# Patient Record
(demographics unavailable — no encounter records)

---

## 2024-10-22 NOTE — ASSESSMENT
[Reviewed updated] : Reviewed updated [Designated Health Care Proxy] : Designated Health Care Proxy [Name: ___] : Name: [unfilled] [Relationship: ___] : Relationship: [unfilled] [DNR] : DNR [DNI] : DNI [Last Verification Date: ___] : Last Verification Date: [unfilled] [FreeTextEntry1] : ## Recurrent metastatic Lung adenocarcinoma status post left iliac bone biopsy with IR [6/28/2024]: Path: Metastatic adenocarcinoma, with mucinous features. This biopsy is compared with previous mediastinal biopsy ([id number]) and shows similar histologic features and IHC profile. s/p SBRT to the left iliac lesion QOD x 3 sessions (Completed 7/29/24) s/p hospitalization at [facility] 8/2/2024 -8/9/2024 for sepsis 2/2 PNA, hypophosphatemia, and pancytopenia after C1.   Here for C5 Taxotere Cyramza Zometa (7/30/24 - present) Feels much better Likely had viral URTI after C4, complicated by superinfection with bacteria. Did not go to ER as he does not trust his local ER nad did not want to travel 2 hours to come to [facility]. Symptoms significantly improved with antibiotics Restaging CT shows improved lung mass but with increase in size of bone lesion, ? adrenal lesion, cirrhosis, and fluid overload Discussed about findings and that he has mixed response. Unfortunately we have limited treatment options including gemcitabine, alimta, ipi+nivo, abraxane He wants to continue with current treatment with close follow up scan as he is tolerating treatment well Labs ordered, drawn in the office, reviewed, analyzed and discussed Proceed with C5 taxotere cyramza Restaging scan in 2-3 months or sooner if needed Continue with phos supplementation. Follow with nephrology Follow up guardant 360  Plan: Taxotere 75 mg/m2 + Cyramza 10 mg/kg every 3 weeks with neulasta Administer Zometa (zoledronic acid) injection every 6 weeks to strengthen bones and reduce risk of fractures. Dex 3 pills night before and AM of the treatment Oxycodone renewed 7/30/24 Zofran PRN for nausea Imodium PRN for diarrhea  #bone mets - to get Zometa q6months - 7/30/2024 - Zometa held due to hypophosphatemia.  - to take Calcium and vitamin D  #Tachycardia  8/28/2024 Echo - EF 55% Seen by cardiologist  Original presentation ## Left lung adenocarcinoma with mucinous features Found on CT done for hoarseness of voice Reports that he noticed change of voice since end of Dec 2023 Saw Dr Pressley (ENT)-> will obtain CT scan Former smoker- 2 ppd x 40 years. Quit 2014, However vaping (Elizabeth) 1-1.5 pod a day. Quit 2 weeks ago PET/CT 2/28/2024: 7.2 x 4.4 cm left hilar mass extending into left apex, SUV 9.4.  Hypermetabolic station 5, station 7 nodes.  Mildly FDG avid station 4R and periaortic lymph node indeterminate in nature.  Soft tissue densities in the left parotid gland most likely lymph nodes SUV 6.9, indeterminate in nature. Clinical stage IIIB/C pT4N2/3M0  3/2024 CT head: No metastasis.  *Could not obtain MRI brain due to vagal nerve stimulator [facility] lung cancer NGS panel: Clear to variant of potential clinical significance STK 11. TMB low, CARINA, PD-L1 TPS 0% [id number] liquid biopsy: VUS in ROBEL, KEAP1, NF1, STK 11 mutation s/p 30 rounds of radiation (3/26/2024 -5/7/2024) s/p C4 carboplatin pemetrexed (3/26/24 -5/20/2024)  ## Elevated PTT Denies any bleeding, clotting. Not on any blood thinners Normal mixing studies, factor 8, 9, 11, 12, with positive silica clotting time, elevated cardiolipin AB IgM (33.4) and B2 glycoprotein IgM (36.3). Will repeat labs in 3 months.   Prostate cancer On active surveillance with Dr Anand  Melanoma - right cheek resected in 2020 Has multiple basal cells   Genetic testing: [id number] liquid biopsy shows ROBEL  mutation  As per [id number], this mutation is suspicious for germline origin and possibly associated with underlying inherited predisposition to certain cancer. His only family history is his father had bladder cancer. Refer to genetics for complete evaluation  Social history: Lives with his wife Tosha in [facility] Quit vaping 3 weeks ago, heavy smoker until 2014 Worked as a   Patient and wife had multiple questions which were answered to satisfaction  Follow up in 3 weeks CBC, CMP, phosphorus [AdvancecareDate] : 06/18/24

## 2024-10-22 NOTE — ASSESSMENT
[FreeTextEntry1] : # Hypophosphatemia Etiology likely from -Decrease absorption from Mylanta or or phos binder TUMs or 2/2 to decrease bone resorption from zometa. - UA: no glucosuria, less likely Fanconi like sd from platinum base.  - Elevated 1-25 vit D , therefore less likely from elevated FGF23 activity.  -PTHrP wnl -urine phosphate low 10.4, urine cr 29.4, urine vol 2.6L - FePO4:  ~ 6 , low  - Phosphate 2.2 from 3.4 but had diarrhea  - cont to Avoid TUMS and Mylanta -cont neutra phos to 500mg 4 times daily.   RTC 6 weeks

## 2024-10-22 NOTE — PHYSICAL EXAM
[General Appearance - Alert] : alert [General Appearance - In No Acute Distress] : in no acute distress [Heart Rate And Rhythm] : heart rate was normal and rhythm regular [Heart Sounds] : normal S1 and S2 [Edema] : there was no peripheral edema [Abdomen Soft] : soft [Abdomen Tenderness] : non-tender [Abnormal Walk] : normal gait [Oriented To Time, Place, And Person] : oriented to person, place, and time

## 2024-10-22 NOTE — HISTORY OF PRESENT ILLNESS
[FreeTextEntry1] : Referred by his oncologist Dr. Jaffe. He has hx of DM, CASIMIRO, melanoma and recent dx of left lung metastatic adenocarcinoma dx on February. He was initially treated with carboplatin pemetrexed (3/26/24 -5/20/2024).  He is currently on chemotherapy Taxotere Cyramza Zometa. Zometa held due to hypophosphatemia. report last infection july.  Phosphate has been low since August, he stated getting phosphate infusion every 3 weeks with his chemo session. He reports symptoms of persistent fatigue, weakness about 3 days after phosphate/chemo infusion which he believes due to low phos level. He stated feeling great post 3 days after iv phos infusion. He is also on neutra phos.  He endorsed taking TUMS and Mylanta.  Denies diarrhea, no alcohol use. Denies iv iron supplements  10/22 Patient reports had aspiration PNA and was started on antibiotic for 2 weeks. He has diarrhea since taking antibiotic, which improving with Pepto Bismol.  Lab reviewed discussed with patient and his wife. Phosphorus stable, initially improved to 3.4 then drop to 2.2 likely because of diarrhea.  He stopped taking Mylanta and TUMS.

## 2024-10-22 NOTE — ASSESSMENT
[Reviewed updated] : Reviewed updated [Designated Health Care Proxy] : Designated Health Care Proxy [Name: ___] : Name: [unfilled] [Relationship: ___] : Relationship: [unfilled] [DNR] : DNR [DNI] : DNI [Last Verification Date: ___] : Last Verification Date: [unfilled] [FreeTextEntry1] : ## Recurrent metastatic Lung adenocarcinoma status post left iliac bone biopsy with IR [6/28/2024]: Path: Metastatic adenocarcinoma, with mucinous features. This biopsy is compared with previous mediastinal biopsy ([id number]) and shows similar histologic features and IHC profile. s/p SBRT to the left iliac lesion QOD x 3 sessions (Completed 7/29/24) s/p hospitalization at [facility] 8/2/2024 -8/9/2024 for sepsis 2/2 PNA, hypophosphatemia, and pancytopenia after C1.   Here for C5 Taxotere Cyramza Zometa (7/30/24 - present) Feels much better Likely had viral URTI after C4, complicated by superinfection with bacteria. Did not go to ER as he does not trust his local ER nad did not want to travel 2 hours to come to [place]. Symptoms significantly improved with antibiotics Restaging CT shows improved lung mass but with increase in size of bone lesion, ? adrenal lesion, cirrhosis, and fluid overload Discussed about findings and that he has mixed response. Unfortunately we have limited treatment options including gemcitabine, alimta, ipi+nivo, abraxane He wants to continue with current treatment with close follow up scan as he is tolerating treatment well Labs ordered, drawn in the office, reviewed, analyzed and discussed Proceed with C5 taxotere cyramza Restaging scan in 2-3 months or sooner if needed Continue with phos supplementation. Follow with nephrology Follow up guardant 360  Plan: Taxotere 75 mg/m2 + Cyramza 10 mg/kg every 3 weeks with neulasta Administer Zometa (zoledronic acid) injection every 6 weeks to strengthen bones and reduce risk of fractures. Dex 3 pills night before and AM of the treatment Oxycodone renewed 7/30/24 Zofran PRN for nausea Imodium PRN for diarrhea  #bone mets - to get Zometa q6months - 7/30/2024 - Zometa held due to hypophosphatemia.  - to take Calcium and vitamin D  #Tachycardia  8/28/2024 Echo - EF 55% Seen by cardiologist  Original presentation ## Left lung adenocarcinoma with mucinous features Found on CT done for hoarseness of voice Reports that he noticed change of voice since end of Dec 2023 Saw Dr Pressley (ENT)-> will obtain CT scan Former smoker- 2 ppd x 40 years. Quit 2014, However vaping (Elizabeth) 1-1.5 pod a day. Quit 2 weeks ago PET/CT 2/28/2024: 7.2 x 4.4 cm left hilar mass extending into left apex, SUV 9.4.  Hypermetabolic station 5, station 7 nodes.  Mildly FDG avid station 4R and periaortic lymph node indeterminate in nature.  Soft tissue densities in the left parotid gland most likely lymph nodes SUV 6.9, indeterminate in nature. Clinical stage IIIB/C pT4N2/3M0  3/2024 CT head: No metastasis.  *Could not obtain MRI brain due to vagal nerve stimulator [facility] lung cancer NGS panel: Clear to variant of potential clinical significance STK 11. TMB low, CARINA, PD-L1 TPS 0% [id number] liquid biopsy: VUS in ROBEL, KEAP1, NF1, STK 11 mutation s/p 30 rounds of radiation (3/26/2024 -5/7/2024) s/p C4 carboplatin pemetrexed (3/26/24 -5/20/2024)  ## Elevated PTT Denies any bleeding, clotting. Not on any blood thinners Normal mixing studies, factor 8, 9, 11, 12, with positive silica clotting time, elevated cardiolipin AB IgM (33.4) and B2 glycoprotein IgM (36.3). Will repeat labs in 3 months.   Prostate cancer On active surveillance with Dr Anand  Melanoma - right cheek resected in 2020 Has multiple basal cells   Genetic testing: [id number] liquid biopsy shows ROBEL  mutation  As per [id number], this mutation is suspicious for germline origin and possibly associated with underlying inherited predisposition to certain cancer. His only family history is his father had bladder cancer. Refer to genetics for complete evaluation  Social history: Lives with his wife Tosha in [facility] Quit vaping 3 weeks ago, heavy smoker until 2014 Worked as a   Patient and wife had multiple questions which were answered to satisfaction  Follow up in 3 weeks CBC, CMP, phosphorus [AdvancecareDate] : 06/18/24

## 2024-10-22 NOTE — REVIEW OF SYSTEMS
[Cough] : cough [SOB on Exertion] : shortness of breath during exertion [Diarrhea] : diarrhea [Fever] : no fever [Chills] : no chills [Chest Pain] : no chest pain [Palpitations] : no palpitations [Constipation] : no constipation [Dysuria] : no dysuria [Hesitancy] : no urinary hesitancy [Dizziness] : no dizziness

## 2024-10-22 NOTE — REVIEW OF SYSTEMS
[Diarrhea: Grade 0] : Diarrhea: Grade 0 [FreeTextEntry2] : 10 point review of systems negative except as outlined in HPI 10 point review of systems negative except as outlined in HPI

## 2024-10-22 NOTE — RESULTS/DATA
[FreeTextEntry1] : Labs ordered, drawn in the office, reviewed, analyzed and discussed CEA 15.5-> 9.3 -> 12.7

## 2024-10-22 NOTE — HISTORY OF PRESENT ILLNESS
[de-identified] : Mr. Stuart Traylor is 68 years old male with left lung cancer here for consultation, referred by Dr. Scott Mcneil Accopmanied by his wife Tosha  Patient with hx of basal cell and melanoma (face/nose 15 yrs ago with Mohs's surgery), prostate cancer (Naseem 2+3, on active surveillance w/ Dr. Hermosillo with [facility] and to have repeat  biopsy on 3/27/2024), type 2 diabetes, CASIMIRO who had gradually lost his voice over a course of month, f/u with ENT Dr. Martinez and diagnosed with vocal cord paralysis. CT Scan ordered (2/8/2024) noted for 6.2 x5.1 x 4.7 cm left hilar heterogeneously enhancing mass lesion with extension medially into the mediastinum min and superiorly into the left upper lobe.   His voice returns after receiving 'botox' injection but still very hoarse  He was then referred to Dr. Tarun Chavez  CT Chest/ABD/Pelvis (6/11/24)  Left upper lobe lung mass, decreased in size.  Mediastinal lymphadenopathy, mildly decreased.  New lytic lesions in the left pelvic bones, compatible with metastases.  Nodular liver contour, suggestive of cirrhosis.  CT guided biopsy (2/23/2024) 1. A limited noncontrast CT scan through the chest shows an approximately 3.2 x 2.2 cm lymph node in the aorticopulmonary window of the left anterior mediastinum. Again noted is a large perihilar lung mass. 2. Intraprocedural images demonstrate the coaxial needle accessing the targeted lymph node from an anterior left parasternal approach. 3. Postbiopsy, no pneumothorax or hemomediastinum is seen.  2/23/2024 Path: A.  MEDIASTINIAL LYMPH NODE BIOPSY: - Adenocarcinoma with mucinous features - No lymph node tissue seen Diagnosis Comment  Sections show adenocarcinoma with extracellular and intracellular mucin in the background of desmoplastic stroma. No lymph node or normal lung tissue is seen.  Immunohistochemical study on block A1 shows that the tumor cells are CK7+ CDX2(patchy)+ SMAD4/DPC(variable, dim)+ CK20- TTF1- NapsinA-.  The immuno profile is non-specific and a lung primary is favored (mucinous lung carcinoma can be negative for TTF-1, NapsinA and positive for CDX2); however, a metastasis from upper gastrointestinal/pancreatobiliary origin cannot be completely ruled out based on pathologic findings alone. Correlation with clinical and radiologic findings is recommended.  PD-L1 and lung cancer NGS studies are pending and an addendum will follow.  2/28/2024 Pet/Ct  1. Small anterior left pneumothorax.  2. 7.2 x 4.4 cm hypermetabolic irregular left hilar mass with extension to the left apex, SUV max 9.4 at the apex, most likely representing malignancy. Correlate with biopsy results.  3. Hypermetabolic station 5 and left station 7 nodes, most likely representing metastatic disease. Mildly FDG avid station 4R and periaortic nodes, indeterminate in nature.  4. FDG avid nodular soft tissue densities in the left parotid gland, most likely lymph nodes, SUV max 6.9, indeterminate in nature. If there is concern for malignancy, biopsy is recommended.  5. Asymmetrically decreased uptake in the right vocal cord, suggestive of dysfunction due to compression or damage of the recurrent laryngeal nerve from the lung mass.   The above findings were reported to GARDENIA Cerda at 10:05 AM on 3/5/2024.  FHx: Father with bladder cancer - Alive  M. uncle with colon cancer  SocialHx: Smoked 2 ppd x 40 yrs - quit  about 10 years Alcohol - rare Illicit drugs - rare Worked heavy equipment   Screenings: Colonoscopy - 2023 Vaccinations Pneumonia -2022 Flu - fall 2023 [place] - fall 2023.  Tdap - can't recall the last  Advance Directives [facility] - Tosha Traylor [phone]   He has one diarrhea episode per day Patient is to follow up with dermatology Dr. Larson later on today for more samples of left upper chest nodule (previously biopsy in 1/2023) and pruritic rash on his face and upper chest since lung biopsy.  Denies of any weight loss, pain, or bleeding.   Patient's hoarse voice slightly improved since botx injection with ENT In March 2024.  Restaging CT [6/11/2024]: Left upper lobe lung mass, decreased in size. Mediastinal lymphadenopathy, mildly decreased. New lytic lesions in the left pelvic bones, compatible with metastases. Nodular liver contour, suggestive of cirrhosis.  He is status post left iliac bone biopsy with IR [6/28/2024]: Path: Metastatic adenocarcinoma, with mucinous features. This biopsy is compared with previous mediastinal biopsy ([id number]) and shows similar histologic features and IHC profile. [de-identified] : Patient is here for C5 Taxotere Cyramza Zometa (7/30/24 - present)  Reduced Taxotere due to poor tolerance with prolong fatigue and mucositis.  s/p C4 carboplatin pemetrexed (3/26/24 -5/20/2024) s/p 30 rounds of radiation (3/26/2024 -5/7/2024) s/p SBRT to the left iliac lesion QOD x 3 sessions (Completed 7/29/24) Accompanied by Tosha (his wife)  He has been having low grade fever T 99, runny nose. Received Abx (Augmentin) which has helped significantly Seen by pulmonology and cardiologist with Echo (8/28/2024) - EF 55%  He has been taking OTC phosphorus supplement   Restaging CT (10/15/24): Left upper lobe perihilar mass, mildly decreased in size compared to prior. Increased/new predominantly bilateral upper lobe patchy consolidations and groundglass opacities with interlobular septal thickening likely represents superimposed pulmonary edema in the setting of new small left and trace right pleural effusions. Differential diagnosis includes infection, hypersensitivity pneumonitis, and other inflammatory etiologies. Multiple osseous lytic lesions, compatible with metastases, increased in size compared to prior. Right adrenal nodule, 0.9 cm, new compared to prior 6/11/2024, may represent a metastatic lesion. Cirrhosis with sequela of portal hypertension.

## 2024-10-22 NOTE — HISTORY OF PRESENT ILLNESS
[de-identified] : Mr. Stuart Traylor is 68 years old male with left lung cancer here for consultation, referred by Dr. Scott Mcneil Accopmanied by his wife Tosha  Patient with hx of basal cell and melanoma (face/nose 15 yrs ago with Mohs's surgery), prostate cancer (Naseem 2+3, on active surveillance w/ Dr. Hermosillo with [facility] and to have repeat  biopsy on 3/27/2024), type 2 diabetes, CASIMIRO who had gradually lost his voice over a course of month, f/u with ENT Dr. Martinez and diagnosed with vocal cord paralysis. CT Scan ordered (2/8/2024) noted for 6.2 x5.1 x 4.7 cm left hilar heterogeneously enhancing mass lesion with extension medially into the mediastinum min and superiorly into the left upper lobe.   His voice returns after receiving 'botox' injection but still very hoarse  He was then referred to Dr. Tarun Chavez  CT Chest/ABD/Pelvis (6/11/24)  Left upper lobe lung mass, decreased in size.  Mediastinal lymphadenopathy, mildly decreased.  New lytic lesions in the left pelvic bones, compatible with metastases.  Nodular liver contour, suggestive of cirrhosis.  CT guided biopsy (2/23/2024) 1. A limited noncontrast CT scan through the chest shows an approximately 3.2 x 2.2 cm lymph node in the aorticopulmonary window of the left anterior mediastinum. Again noted is a large perihilar lung mass. 2. Intraprocedural images demonstrate the coaxial needle accessing the targeted lymph node from an anterior left parasternal approach. 3. Postbiopsy, no pneumothorax or hemomediastinum is seen.  2/23/2024 Path: A.  MEDIASTINIAL LYMPH NODE BIOPSY: - Adenocarcinoma with mucinous features - No lymph node tissue seen Diagnosis Comment  Sections show adenocarcinoma with extracellular and intracellular mucin in the background of desmoplastic stroma. No lymph node or normal lung tissue is seen.  Immunohistochemical study on block A1 shows that the tumor cells are CK7+ CDX2(patchy)+ SMAD4/DPC(variable, dim)+ CK20- TTF1- NapsinA-.  The immuno profile is non-specific and a lung primary is favored (mucinous lung carcinoma can be negative for TTF-1, NapsinA and positive for CDX2); however, a metastasis from upper gastrointestinal/pancreatobiliary origin cannot be completely ruled out based on pathologic findings alone. Correlation with clinical and radiologic findings is recommended.  PD-L1 and lung cancer NGS studies are pending and an addendum will follow.  2/28/2024 Pet/Ct  1. Small anterior left pneumothorax.  2. 7.2 x 4.4 cm hypermetabolic irregular left hilar mass with extension to the left apex, SUV max 9.4 at the apex, most likely representing malignancy. Correlate with biopsy results.  3. Hypermetabolic station 5 and left station 7 nodes, most likely representing metastatic disease. Mildly FDG avid station 4R and periaortic nodes, indeterminate in nature.  4. FDG avid nodular soft tissue densities in the left parotid gland, most likely lymph nodes, SUV max 6.9, indeterminate in nature. If there is concern for malignancy, biopsy is recommended.  5. Asymmetrically decreased uptake in the right vocal cord, suggestive of dysfunction due to compression or damage of the recurrent laryngeal nerve from the lung mass.   The above findings were reported to GARDENIA Cerda at 10:05 AM on 3/5/2024.  FHx: Father with bladder cancer - Alive  M. uncle with colon cancer  SocialHx: Smoked 2 ppd x 40 yrs - quit  about 10 years Alcohol - rare Illicit drugs - rare Worked heavy equipment   Screenings: Colonoscopy - 2023 Vaccinations Pneumonia -2022 Flu - fall 2023 [place] - fall 2023.  Tdap - can't recall the last  Advance Directives [facility] - Tosha Traylor [phone]   He has one diarrhea episode per day Patient is to follow up with dermatology Dr. Larson later on today for more samples of left upper chest nodule (previously biopsy in 1/2023) and pruritic rash on his face and upper chest since lung biopsy.  Denies of any weight loss, pain, or bleeding.   Patient's hoarse voice slightly improved since botx injection with ENT In March 2024.  Restaging CT [6/11/2024]: Left upper lobe lung mass, decreased in size. Mediastinal lymphadenopathy, mildly decreased. New lytic lesions in the left pelvic bones, compatible with metastases. Nodular liver contour, suggestive of cirrhosis.  He is status post left iliac bone biopsy with IR [6/28/2024]: Path: Metastatic adenocarcinoma, with mucinous features. This biopsy is compared with previous mediastinal biopsy ([id number]) and shows similar histologic features and IHC profile. [de-identified] : Patient is here for C5 Taxotere Cyramza Zometa (7/30/24 - present)  Reduced Taxotere due to poor tolerance with prolong fatigue and mucositis.  s/p C4 carboplatin pemetrexed (3/26/24 -5/20/2024) s/p 30 rounds of radiation (3/26/2024 -5/7/2024) s/p SBRT to the left iliac lesion QOD x 3 sessions (Completed 7/29/24) Accompanied by Tosha (his wife)  He has been having low grade fever T 99, runny nose. Received Abx (Augmentin) which has helped significantly Seen by pulmonology and cardiologist with Echo (8/28/2024) - EF 55%  He has been taking OTC phosphorus supplement   Restaging CT (10/15/24): Left upper lobe perihilar mass, mildly decreased in size compared to prior. Increased/new predominantly bilateral upper lobe patchy consolidations and groundglass opacities with interlobular septal thickening likely represents superimposed pulmonary edema in the setting of new small left and trace right pleural effusions. Differential diagnosis includes infection, hypersensitivity pneumonitis, and other inflammatory etiologies. Multiple osseous lytic lesions, compatible with metastases, increased in size compared to prior. Right adrenal nodule, 0.9 cm, new compared to prior 6/11/2024, may represent a metastatic lesion. Cirrhosis with sequela of portal hypertension.